# Patient Record
Sex: FEMALE | Race: WHITE | NOT HISPANIC OR LATINO | Employment: PART TIME | URBAN - METROPOLITAN AREA
[De-identification: names, ages, dates, MRNs, and addresses within clinical notes are randomized per-mention and may not be internally consistent; named-entity substitution may affect disease eponyms.]

---

## 2021-01-08 ENCOUNTER — APPOINTMENT (OUTPATIENT)
Dept: CT IMAGING | Facility: HOSPITAL | Age: 31
End: 2021-01-08

## 2021-01-08 ENCOUNTER — APPOINTMENT (OUTPATIENT)
Dept: ULTRASOUND IMAGING | Facility: HOSPITAL | Age: 31
End: 2021-01-08

## 2021-01-08 ENCOUNTER — HOSPITAL ENCOUNTER (EMERGENCY)
Facility: HOSPITAL | Age: 31
Discharge: HOME | End: 2021-01-08
Admitting: EMERGENCY MEDICINE

## 2021-01-08 VITALS
WEIGHT: 158.29 LBS | TEMPERATURE: 98.3 F | DIASTOLIC BLOOD PRESSURE: 86 MMHG | OXYGEN SATURATION: 100 % | HEART RATE: 84 BPM | HEIGHT: 61 IN | RESPIRATION RATE: 18 BRPM | BODY MASS INDEX: 29.89 KG/M2 | SYSTOLIC BLOOD PRESSURE: 121 MMHG

## 2021-01-08 DIAGNOSIS — R10.30 LOWER ABDOMINAL PAIN: Primary | ICD-10-CM

## 2021-01-08 DIAGNOSIS — N76.0 BACTERIAL VAGINOSIS: ICD-10-CM

## 2021-01-08 DIAGNOSIS — N39.0 URINARY TRACT INFECTION WITHOUT HEMATURIA, SITE UNSPECIFIED: ICD-10-CM

## 2021-01-08 DIAGNOSIS — N83.209 CYST OF OVARY, UNSPECIFIED LATERALITY: ICD-10-CM

## 2021-01-08 DIAGNOSIS — B96.89 BACTERIAL VAGINOSIS: ICD-10-CM

## 2021-01-08 LAB
ALBUMIN SERPL-MCNC: 3.9 G/DL (ref 3.5–5.2)
ALBUMIN/GLOB SERPL: 1.1 G/DL
ALP SERPL-CCNC: 80 U/L (ref 39–117)
ALT SERPL W P-5'-P-CCNC: 17 U/L (ref 1–33)
ANION GAP SERPL CALCULATED.3IONS-SCNC: 10 MMOL/L (ref 5–15)
AST SERPL-CCNC: 18 U/L (ref 1–32)
B-HCG UR QL: NEGATIVE
BACTERIA UR QL AUTO: ABNORMAL /HPF
BASOPHILS # BLD AUTO: 0 10*3/MM3 (ref 0–0.2)
BASOPHILS NFR BLD AUTO: 0.4 % (ref 0–1.5)
BILIRUB SERPL-MCNC: 0.4 MG/DL (ref 0–1.2)
BILIRUB UR QL STRIP: NEGATIVE
BUN SERPL-MCNC: 11 MG/DL (ref 6–20)
BUN/CREAT SERPL: 15.1 (ref 7–25)
CALCIUM SPEC-SCNC: 9.3 MG/DL (ref 8.6–10.5)
CHLORIDE SERPL-SCNC: 103 MMOL/L (ref 98–107)
CLARITY UR: CLEAR
CLUE CELLS SPEC QL WET PREP: ABNORMAL
CO2 SERPL-SCNC: 24 MMOL/L (ref 22–29)
COLOR UR: YELLOW
CREAT SERPL-MCNC: 0.73 MG/DL (ref 0.57–1)
DEPRECATED RDW RBC AUTO: 40.7 FL (ref 37–54)
EOSINOPHIL # BLD AUTO: 0.1 10*3/MM3 (ref 0–0.4)
EOSINOPHIL NFR BLD AUTO: 1 % (ref 0.3–6.2)
ERYTHROCYTE [DISTWIDTH] IN BLOOD BY AUTOMATED COUNT: 12.7 % (ref 12.3–15.4)
GFR SERPL CREATININE-BSD FRML MDRD: 92 ML/MIN/1.73
GLOBULIN UR ELPH-MCNC: 3.5 GM/DL
GLUCOSE SERPL-MCNC: 88 MG/DL (ref 65–99)
GLUCOSE UR STRIP-MCNC: NEGATIVE MG/DL
HCG INTACT+B SERPL-ACNC: <0.5 MIU/ML
HCT VFR BLD AUTO: 36.9 % (ref 34–46.6)
HGB BLD-MCNC: 12.7 G/DL (ref 12–15.9)
HGB UR QL STRIP.AUTO: NEGATIVE
HYALINE CASTS UR QL AUTO: ABNORMAL /LPF
HYDATID CYST SPEC WET PREP: ABNORMAL
KETONES UR QL STRIP: NEGATIVE
LEUKOCYTE ESTERASE UR QL STRIP.AUTO: ABNORMAL
LIPASE SERPL-CCNC: 21 U/L (ref 13–60)
LYMPHOCYTES # BLD AUTO: 1.7 10*3/MM3 (ref 0.7–3.1)
LYMPHOCYTES NFR BLD AUTO: 24.1 % (ref 19.6–45.3)
MCH RBC QN AUTO: 31.8 PG (ref 26.6–33)
MCHC RBC AUTO-ENTMCNC: 34.4 G/DL (ref 31.5–35.7)
MCV RBC AUTO: 92.7 FL (ref 79–97)
MONOCYTES # BLD AUTO: 0.5 10*3/MM3 (ref 0.1–0.9)
MONOCYTES NFR BLD AUTO: 7.2 % (ref 5–12)
NEUTROPHILS NFR BLD AUTO: 4.8 10*3/MM3 (ref 1.7–7)
NEUTROPHILS NFR BLD AUTO: 67.3 % (ref 42.7–76)
NITRITE UR QL STRIP: NEGATIVE
NRBC BLD AUTO-RTO: 0.1 /100 WBC (ref 0–0.2)
PH UR STRIP.AUTO: 7 [PH] (ref 5–8)
PLATELET # BLD AUTO: 286 10*3/MM3 (ref 140–450)
PMV BLD AUTO: 7.7 FL (ref 6–12)
POTASSIUM SERPL-SCNC: 4.1 MMOL/L (ref 3.5–5.2)
PROT SERPL-MCNC: 7.4 G/DL (ref 6–8.5)
PROT UR QL STRIP: NEGATIVE
RBC # BLD AUTO: 3.98 10*6/MM3 (ref 3.77–5.28)
RBC # UR: ABNORMAL /HPF
REF LAB TEST METHOD: ABNORMAL
SODIUM SERPL-SCNC: 137 MMOL/L (ref 136–145)
SP GR UR STRIP: 1.03 (ref 1–1.03)
SQUAMOUS #/AREA URNS HPF: ABNORMAL /HPF
T VAGINALIS SPEC QL WET PREP: ABNORMAL
UROBILINOGEN UR QL STRIP: ABNORMAL
WBC # BLD AUTO: 7.2 10*3/MM3 (ref 3.4–10.8)
WBC SPEC QL WET PREP: ABNORMAL
WBC UR QL AUTO: ABNORMAL /HPF
YEAST GENITAL QL WET PREP: ABNORMAL

## 2021-01-08 PROCEDURE — 25010000002 MORPHINE PER 10 MG: Performed by: PHYSICIAN ASSISTANT

## 2021-01-08 PROCEDURE — 81025 URINE PREGNANCY TEST: CPT | Performed by: PHYSICIAN ASSISTANT

## 2021-01-08 PROCEDURE — 84702 CHORIONIC GONADOTROPIN TEST: CPT | Performed by: PHYSICIAN ASSISTANT

## 2021-01-08 PROCEDURE — 74177 CT ABD & PELVIS W/CONTRAST: CPT

## 2021-01-08 PROCEDURE — 96361 HYDRATE IV INFUSION ADD-ON: CPT

## 2021-01-08 PROCEDURE — 87491 CHLMYD TRACH DNA AMP PROBE: CPT | Performed by: PHYSICIAN ASSISTANT

## 2021-01-08 PROCEDURE — 85025 COMPLETE CBC W/AUTO DIFF WBC: CPT | Performed by: PHYSICIAN ASSISTANT

## 2021-01-08 PROCEDURE — 83690 ASSAY OF LIPASE: CPT | Performed by: PHYSICIAN ASSISTANT

## 2021-01-08 PROCEDURE — 25010000002 CEFTRIAXONE PER 250 MG: Performed by: PHYSICIAN ASSISTANT

## 2021-01-08 PROCEDURE — 0 IOPAMIDOL PER 1 ML: Performed by: PHYSICIAN ASSISTANT

## 2021-01-08 PROCEDURE — 76856 US EXAM PELVIC COMPLETE: CPT

## 2021-01-08 PROCEDURE — 99284 EMERGENCY DEPT VISIT MOD MDM: CPT

## 2021-01-08 PROCEDURE — 96374 THER/PROPH/DIAG INJ IV PUSH: CPT

## 2021-01-08 PROCEDURE — 25010000002 KETOROLAC TROMETHAMINE PER 15 MG: Performed by: PHYSICIAN ASSISTANT

## 2021-01-08 PROCEDURE — P9612 CATHETERIZE FOR URINE SPEC: HCPCS

## 2021-01-08 PROCEDURE — 80053 COMPREHEN METABOLIC PANEL: CPT | Performed by: PHYSICIAN ASSISTANT

## 2021-01-08 PROCEDURE — 76830 TRANSVAGINAL US NON-OB: CPT

## 2021-01-08 PROCEDURE — 87591 N.GONORRHOEAE DNA AMP PROB: CPT | Performed by: PHYSICIAN ASSISTANT

## 2021-01-08 PROCEDURE — 25010000002 ONDANSETRON PER 1 MG: Performed by: PHYSICIAN ASSISTANT

## 2021-01-08 PROCEDURE — 96375 TX/PRO/DX INJ NEW DRUG ADDON: CPT

## 2021-01-08 PROCEDURE — 87210 SMEAR WET MOUNT SALINE/INK: CPT | Performed by: PHYSICIAN ASSISTANT

## 2021-01-08 PROCEDURE — 81001 URINALYSIS AUTO W/SCOPE: CPT | Performed by: PHYSICIAN ASSISTANT

## 2021-01-08 RX ORDER — ONDANSETRON 2 MG/ML
4 INJECTION INTRAMUSCULAR; INTRAVENOUS ONCE
Status: COMPLETED | OUTPATIENT
Start: 2021-01-08 | End: 2021-01-08

## 2021-01-08 RX ORDER — METRONIDAZOLE 500 MG/1
500 TABLET ORAL 2 TIMES DAILY
Qty: 28 TABLET | Refills: 0 | Status: SHIPPED | OUTPATIENT
Start: 2021-01-08 | End: 2021-01-22

## 2021-01-08 RX ORDER — DOXYCYCLINE 100 MG/1
100 CAPSULE ORAL 2 TIMES DAILY
Qty: 28 CAPSULE | Refills: 0 | Status: SHIPPED | OUTPATIENT
Start: 2021-01-08 | End: 2021-01-22

## 2021-01-08 RX ORDER — KETOROLAC TROMETHAMINE 15 MG/ML
15 INJECTION, SOLUTION INTRAMUSCULAR; INTRAVENOUS ONCE
Status: COMPLETED | OUTPATIENT
Start: 2021-01-08 | End: 2021-01-08

## 2021-01-08 RX ORDER — MORPHINE SULFATE 4 MG/ML
4 INJECTION, SOLUTION INTRAMUSCULAR; INTRAVENOUS ONCE
Status: COMPLETED | OUTPATIENT
Start: 2021-01-08 | End: 2021-01-08

## 2021-01-08 RX ORDER — AZITHROMYCIN 250 MG/1
1000 TABLET, FILM COATED ORAL ONCE
Status: DISCONTINUED | OUTPATIENT
Start: 2021-01-08 | End: 2021-01-08

## 2021-01-08 RX ORDER — NITROFURANTOIN 25; 75 MG/1; MG/1
100 CAPSULE ORAL 2 TIMES DAILY
Qty: 10 CAPSULE | Refills: 0 | Status: SHIPPED | OUTPATIENT
Start: 2021-01-08

## 2021-01-08 RX ORDER — SODIUM CHLORIDE 0.9 % (FLUSH) 0.9 %
10 SYRINGE (ML) INJECTION AS NEEDED
Status: DISCONTINUED | OUTPATIENT
Start: 2021-01-08 | End: 2021-01-09 | Stop reason: HOSPADM

## 2021-01-08 RX ADMIN — ONDANSETRON 4 MG: 2 INJECTION, SOLUTION INTRAMUSCULAR; INTRAVENOUS at 19:11

## 2021-01-08 RX ADMIN — SODIUM CHLORIDE 500 ML: 9 INJECTION, SOLUTION INTRAVENOUS at 19:11

## 2021-01-08 RX ADMIN — IOPAMIDOL 100 ML: 755 INJECTION, SOLUTION INTRAVENOUS at 22:18

## 2021-01-08 RX ADMIN — KETOROLAC TROMETHAMINE 15 MG: 15 INJECTION, SOLUTION INTRAMUSCULAR; INTRAVENOUS at 21:30

## 2021-01-08 RX ADMIN — CEFTRIAXONE SODIUM 1 G: 10 INJECTION, POWDER, FOR SOLUTION INTRAVENOUS at 23:17

## 2021-01-08 RX ADMIN — MORPHINE SULFATE 4 MG: 4 INJECTION INTRAVENOUS at 19:11

## 2021-01-08 RX ADMIN — Medication 10 ML: at 23:25

## 2021-01-08 NOTE — ED TRIAGE NOTES
"Pt c/o \"all over\" abdominal pain with vaginal bleeding, c/o bladder pressure and nausea. Pt unknown if could be pregnant.   "

## 2021-01-09 NOTE — DISCHARGE INSTRUCTIONS
Take antibiotics as directed.  Be sure to take full course of all the antibiotics.  Consider taking probiotic or eating yogurt daily while on antibiotic and up to 10 days after to replace the good bacteria in your gastrointestinal tract; this can reduce chances of developing a GI infection such as C difficile    Do not drink alcohol while taking Flagyl and up to 72 hours after completion of this medication.    Tylenol or ibuprofen as needed for fever or pain.    Follow-up with your OB/GYN or Dr. Delacruz as listed below for further evaluation and management    Follow about pending culture/labs through medical records or patient portal, please call hospital at [phone] for further information, asked for help information Management    Follow-up with your primary care provider in 3-5 days.  If you do not have a primary care provider call [phone] for help in finding one, or you may follow up with [facility] at [phone].    Return to ED for any new or worsening symptoms

## 2021-01-09 NOTE — ED NOTES
Patient sitting at bedside with no adverse reactions noted from ABT. Patient alert and oriented with no c/o SOA, nausea, no hives, or c/o of swelling of the throat. Patient reports that she feels better than when she first arrived at the ED.      Jv Mayberry RN  01/08/21 [id number]

## 2021-01-09 NOTE — ED PROVIDER NOTES
Subjective   Patient is a 31-year-old female presents with complaints of lower abdominal cramping and vaginal bleeding for the past 5 days.  She describes the pain as a constant sharp type pain that she currently rates a 9/10 severity.  She states that intermittently radiates to her low back at times.  Patient states she has been passing large blood clots and states she is using 2 super tampons about every hour and a half.  States her last known menstrual period was 12/17/2020 she states there is a possibility of pregnancy as she has had unprotected sex with one partner.  She is also concerned for STDs.  She does report some vaginal discharge over the past few days as well.  She also reports some associated nausea and pressure type pain with urination.  She denies any fever, vomiting, lightheadedness or dizziness, chest pain, shortness of breath, recent travel, known sick contacts.  States she tried taking Tylenol with minimal relief of her symptoms.  Patient reports she has had some constipation states she did have a small bowel movement today without any hematochezia or melena.  Patient does report she is to current everyday smoker also reports THC and methamphetamine use.  Patient states she last used methamphetamine a day.          Review of Systems   Constitutional: Negative.    HENT: Negative.    Eyes: Negative for photophobia and visual disturbance.   Respiratory: Negative.    Cardiovascular: Negative.    Gastrointestinal: Positive for abdominal pain, constipation and nausea. Negative for abdominal distention, blood in stool, diarrhea and vomiting.   Genitourinary: Positive for pelvic pain, urgency, vaginal bleeding and vaginal discharge. Negative for decreased urine volume, difficulty urinating, dysuria, flank pain, hematuria and vaginal pain.   Musculoskeletal: Negative for neck pain and neck stiffness.   Skin: Negative.    Neurological: Negative.    Hematological: Negative.        History reviewed. No  pertinent past medical history.    Allergies   Allergen Reactions   • Penicillins Anaphylaxis       Past Surgical History:   Procedure Laterality Date   •  SECTION     • KNEE SURGERY         History reviewed. No pertinent family history.    Social History     Socioeconomic History   • Marital status: Single     Spouse name: Not on file   • Number of children: Not on file   • Years of education: Not on file   • Highest education level: Not on file   Tobacco Use   • Smoking status: Current Every Day Smoker     Types: Cigarettes   Substance and Sexual Activity   • Alcohol use: Yes   • Drug use: Yes     Types: Marijuana     Comment: pt reports other drug use   • Sexual activity: Yes           Objective   Physical Exam  Vitals signs and nursing note reviewed. Exam conducted with a chaperone present.   Constitutional:       General: She is not in acute distress.     Appearance: She is well-developed. She is not ill-appearing, toxic-appearing or diaphoretic.   HENT:      Head: Normocephalic and atraumatic.      Mouth/Throat:      Mouth: Mucous membranes are moist.      Pharynx: Oropharynx is clear.   Eyes:      General: No scleral icterus.     Extraocular Movements: Extraocular movements intact.      Pupils: Pupils are equal, round, and reactive to light.   Cardiovascular:      Rate and Rhythm: Normal rate and regular rhythm.      Pulses: Normal pulses.      Heart sounds: Normal heart sounds. No murmur. No friction rub. No gallop.    Pulmonary:      Effort: Pulmonary effort is normal. No respiratory distress.      Breath sounds: Normal breath sounds. No stridor. No wheezing, rhonchi or rales.   Chest:      Chest wall: No tenderness.   Abdominal:      General: Bowel sounds are normal. There is no distension. There are no signs of injury.      Palpations: Abdomen is soft. There is no fluid wave, hepatomegaly, splenomegaly or mass.      Tenderness: There is abdominal tenderness in the right lower quadrant, suprapubic  "area and left lower quadrant. There is no right CVA tenderness, left CVA tenderness, guarding or rebound. Negative signs include Harry's sign and McBurney's sign.      Hernia: No hernia is present.   Genitourinary:     Comments: Normal hair distribution, no lesions, masses, or swelling. No malodorous discharge noted from vagina.  Speculum exam performed: OS closed there is blood noted in vaginal vault coming from os.  Nonodorous white discharge noted  adnexal tenderness without fullness.  No cervical motion tenderness.  Skin:     General: Skin is warm.      Capillary Refill: Capillary refill takes less than 2 seconds.      Coloration: Skin is not cyanotic, jaundiced or pale.      Findings: No rash.   Neurological:      General: No focal deficit present.      Mental Status: She is alert and oriented to person, place, and time.      GCS: GCS eye subscore is 4. GCS verbal subscore is 5. GCS motor subscore is 6.   Psychiatric:         Mood and Affect: Mood normal.         Behavior: Behavior normal.         Procedures           ED Course  ED Course as of Jan 08 2359 Fri Jan 08, 2021 2011 Lab results were reviewed currently waiting on ultrasound results.    [AA]      ED Course User Index  [AA] Freida Johansen PA      /86 (BP Location: Left arm, Patient Position: Sitting)   Pulse 84   Temp 98.3 °F (36.8 °C) (Oral)   Resp 18   Ht 154.9 cm (61\")   Wt 71.8 kg (158 lb 4.6 oz)   LMP 12/17/2020 (Approximate)   SpO2 100%   BMI 29.91 kg/m²   Medications   sodium chloride 0.9 % flush 10 mL (10 mL Intravenous Given 1/8/21 2325)   sodium chloride 0.9 % bolus 500 mL (0 mL Intravenous Stopped 1/8/21 2106)   ondansetron (ZOFRAN) injection 4 mg (4 mg Intravenous Given 1/8/21 1911)   Morphine sulfate (PF) injection 4 mg (4 mg Intravenous Given 1/8/21 1911)   ketorolac (TORADOL) injection 15 mg (15 mg Intravenous Given 1/8/21 2130)   iopamidol (ISOVUE-370) 76 % injection 100 mL (100 mL Intravenous Given 1/8/21 2218) "   cefTRIAXone (ROCEPHIN) in [facility] 1 gram/10ml IV PUSH syringe (1 g Intravenous Given 1/8/21 2317)     Labs Reviewed   WET PREP, GENITAL - Abnormal; Notable for the following components:       Result Value    WBC'S 1+ WBC's seen (*)     All other components within normal limits   URINALYSIS W/ CULTURE IF INDICATED - Abnormal; Notable for the following components:    Leuk Esterase, UA Small (1+) (*)     All other components within normal limits   URINALYSIS, MICROSCOPIC ONLY - Abnormal; Notable for the following components:    WBC, UA 3-5 (*)     Bacteria, UA Trace (*)     All other components within normal limits   LIPASE - Normal   PREGNANCY, URINE - Normal   CBC WITH AUTO DIFFERENTIAL - Normal   CHLAMYDIA TRACHOMATIS, NEISSERIA GONORRHOEAE, PCR   HCG, QUANTITATIVE, PREGNANCY    Narrative:     HCG Ranges by Gestational Age    Females - non-pregnant premenopausal   </= 1mIU/mL HCG  Females - postmenopausal               </= 7mIU/mL HCG    3 Weeks         5.8 -    71.2 mIU/mL  4 Weeks         9.5 -     750 mIU/mL  5 Weeks         217 -   7,138 mIU/mL  6 Weeks         158 -  31,795 mIU/mL  7 Weeks       3,697 - 163,563 mIU/mL  8 Weeks      32,065 - 149,571 mIU/mL  9 Weeks      63,803 - 151,410 mIU/mL  10 Weeks     46,509 - 186,977 mIU/mL  12 Weeks     27,832 - 210,612 mIU/mL  14 Weeks     13,950 -  62,530 mIU/mL  15 Weeks     12,039 -  70,971 mIU/mL  16 Weeks      9,040 -  56,451 mIU/mL  17 Weeks      8,175 -  55,868 mIU/mL  18 Weeks      8,099 -  58,176 mIU/mL  Results may be falsely decreased if patient taking Biotin.     COMPREHENSIVE METABOLIC PANEL    Narrative:     GFR Normal >60  Chronic Kidney Disease <60  Kidney Failure <15     CBC AND DIFFERENTIAL    Narrative:     The following orders were created for panel order CBC & Differential.  Procedure                               Abnormality         Status                     ---------                               -----------         ------                     CBC  Auto Differential[[id number]]        Normal              Final result                 Please view results for these tests on the individual orders.     Us Pelvis Complete    Result Date: 1/8/2021  1.There is an unusual appearance to right ovary with a somewhat exophytic heterogeneous area. Whether this reflects sequela to involuted cyst is uncertain. A follow-up nonemergent MR might be helpful to reassess this area. A CT of the pelvis with contrast might also be helpful.  Electronically Signed By-Nate Mccauley MD On:1/8/2021 8:44 PM This report was finalized on [id number] by  Nate Mccaulye MD.    Ct Abdomen Pelvis With Contrast    Result Date: 1/8/2021  1.There are cystic areas within the ovaries with areas of rind enhancement that may relate to involuting cysts. This may be accounting for the appearance to the right ovary on ultrasound. Sequela of PID is thought less likely but should be correlated with clinical findings. One might consider a follow-up ultrasound in several months time to reassess the ovaries or sooner as clinically appropriate. 2.There is some free fluid in the pelvis which could be physiologic. 3.There are some fluid-filled loops of small bowel. Whether this might relate to ileus, enteritis or motility changes associated with moderate stool burden in the right colon is uncertain. 4.Sclerosis of the iliac portion of the sacral iliac joints.  Electronically Signed By-Nate Mccauley MD On:1/8/2021 10:39 PM This report was finalized on [id number] by  Nate Mccauley MD.                                         MDM  Number of Diagnoses or Management Options  Bacterial vaginosis:   Cyst of ovary, unspecified laterality:   Lower abdominal pain:   Urinary tract infection without hematuria, site unspecified:   Diagnosis management comments: Chart Review:  Comorbidity: Polysubstance abuse  Labs: CBC shows WBC 7.2 hemoglobin 12.7 hematocrit 36.9 platelets 286.  CMP shows glucose 88 BUN 11 creatinine 0.73  sodium 137 potassium 4.1.  Urinalysis significant for UTI with trace bacteria small leukocyte esterase no blood noted.  Wet prep significant for 1+ WBCs no clue cells trichomonas or yeast noted.  Urine pregnancy negative hCG quant less than 0.5 lipase 21 gonorrhea chlamydia pending  Imaging: Was interpreted by physician and reviewed by myself:  Us Pelvis Complete  Result Date: 1/8/2021  1.There is an unusual appearance to right ovary with a somewhat exophytic heterogeneous area. Whether this reflects sequela to involuted cyst is uncertain. A follow-up nonemergent MR might be helpful to reassess this area. A CT of the pelvis with contrast might also be helpful.  Electronically Signed By-Nate Mccauley MD On:1/8/2021 8:44 PM This report was finalized on [id number] by  Nate Mccauley MD.    Ct Abdomen Pelvis With Contrast  Result Date: 1/8/2021  1.There are cystic areas within the ovaries with areas of rind enhancement that may relate to involuting cysts. This may be accounting for the appearance to the right ovary on ultrasound. Sequela of PID is thought less likely but should be correlated with clinical findings. One might consider a follow-up ultrasound in several months time to reassess the ovaries or sooner as clinically appropriate. 2.There is some free fluid in the pelvis which could be physiologic. 3.There are some fluid-filled loops of small bowel. Whether this might relate to ileus, enteritis or motility changes associated with moderate stool burden in the right colon is uncertain. 4.Sclerosis of the iliac portion of the sacral iliac joints.  Electronically Signed By-Nate Mccauley MD On:1/8/2021 10:39 PM This report was finalized on [id number] by  Nate Mccauley MD.    Disposition/Treatment:  Appropriate PPE was worn during exam and throughout all encounters with the patient.  When the ED IV was placed and labs were obtained patient is given fluids morphine and Zofran she was afebrile and appeared nontoxic.   She was given additional Toradol for her pain she had no episodes of emesis while in the ED tolerating p.o. fluids.  On physical exam she did have some blood noted in vaginal vault no large clots noted.  Ultrasound showed unusual appearance of right ovary could potentially be a cyst recommended CT therefore CT of abdomen pelvis was ordered which showed cystic areas of the ovaries some enhancement sequela PID was thought to be less likely she does have a normal white count has not had a fever but is very tender on exam recommended follow-up ultrasound in several months to reassess the ovaries.  Otherwise had some free fluid in the pelvis which was likely physiologic and some fluid-filled loops in the small bowel which could be related to ileus.  Significant lab work was 1+ WBCs on wet prep no trichomonas yeast or clue cells noted.  She was also found to have a UTI while in the ED.  Gonorrhea and Chlamydia were pending but patient would like to be pretreated at this time. urine pregnancy and hCG quant were negative.  There are no signs of severe infection or electrolyte abnormality on CBC and CMP.  Lipase was within normal limits.  Upon reassessment she is resting quietly does report improvement of her pain.  While in the ED patient be given Rocephin while in the ED and will be sent home with doxycyline per new guidelines for chlamydia treament.  She will be given a 14-day treatment of doxycycline to cover for possible PID.  She will be given Flagyl for home for bacterial vaginosis along with Macrobid for her UTI.  Stressed the importance of following with her primary care provider and OB/GYN for further evaluation and management.  Lab results and findings were discussed with the patient  who voiced understanding of discharge instructions along with signs and symptoms requiring return to the ED.  Upon discharged patient was in stable condition with followup for a revaluation.        Amount and/or Complexity of Data  Reviewed  Clinical lab tests: reviewed  Tests in the radiology section of CPT®: reviewed        Final diagnoses:   Lower abdominal pain   Bacterial vaginosis   Urinary tract infection without hematuria, site unspecified   Cyst of ovary, unspecified laterality            Freida Johansen PA  01/08/21 [id number]

## 2021-01-12 LAB
C TRACH RRNA SPEC QL NAA+PROBE: POSITIVE
N GONORRHOEA RRNA SPEC QL NAA+PROBE: POSITIVE

## 2023-10-20 NOTE — ED NOTES
Pt A&Ox 4 with lungs CTA bases. Pt ambulatory at this time.   Discharge instruction given to pt with verbal understanding received.   Pt discharged with education, RX, and personal belongings.        Jv Mayberry, RN  01/09/21 0004     thin liquid mildly thick/pureed/easy to chew